# Patient Record
Sex: FEMALE | Race: WHITE | Employment: OTHER | URBAN - METROPOLITAN AREA
[De-identification: names, ages, dates, MRNs, and addresses within clinical notes are randomized per-mention and may not be internally consistent; named-entity substitution may affect disease eponyms.]

---

## 2017-01-26 ENCOUNTER — APPOINTMENT (OUTPATIENT)
Dept: PHYSICAL THERAPY | Age: 82
End: 2017-01-26

## 2017-11-12 ENCOUNTER — HOSPITAL ENCOUNTER (EMERGENCY)
Age: 82
Discharge: HOME | End: 2017-11-12
Attending: EMERGENCY MEDICINE | Admitting: EMERGENCY MEDICINE
Payer: MEDICARE

## 2017-11-12 VITALS
HEIGHT: 66 IN | RESPIRATION RATE: 15 BRPM | WEIGHT: 110 LBS | DIASTOLIC BLOOD PRESSURE: 49 MMHG | TEMPERATURE: 98 F | OXYGEN SATURATION: 99 % | HEART RATE: 71 BPM | BODY MASS INDEX: 17.68 KG/M2 | SYSTOLIC BLOOD PRESSURE: 130 MMHG

## 2017-11-12 DIAGNOSIS — R42 LIGHTHEADED: Primary | ICD-10-CM

## 2017-11-12 LAB
ANION GAP BLD CALC-SCNC: 16 MMOL/L (ref 10–20)
ATRIAL RATE: 73 BPM
BUN BLD-MCNC: 19 MG/DL (ref 7–18)
CA-I BLD-MCNC: 1.31 MMOL/L (ref 1.12–1.32)
CALCULATED P AXIS, ECG09: -21 DEGREES
CALCULATED R AXIS, ECG10: -27 DEGREES
CALCULATED T AXIS, ECG11: 35 DEGREES
CHLORIDE BLD-SCNC: 104 MMOL/L (ref 100–108)
CO2 BLD-SCNC: 26 MMOL/L (ref 19–24)
CREAT UR-MCNC: 1.2 MG/DL (ref 0.6–1.3)
DIAGNOSIS, 93000: NORMAL
GLUCOSE BLD STRIP.AUTO-MCNC: 99 MG/DL (ref 74–106)
HCT VFR BLD CALC: 38 % (ref 36–49)
HGB BLD-MCNC: 12.9 G/DL (ref 12–16)
P-R INTERVAL, ECG05: 188 MS
POTASSIUM BLD-SCNC: 4 MMOL/L (ref 3.5–5.5)
Q-T INTERVAL, ECG07: 400 MS
QRS DURATION, ECG06: 90 MS
QTC CALCULATION (BEZET), ECG08: 440 MS
SODIUM BLD-SCNC: 141 MMOL/L (ref 136–145)
TROPONIN I BLD-MCNC: <0.04 NG/ML (ref 0–0.08)
VENTRICULAR RATE, ECG03: 73 BPM

## 2017-11-12 PROCEDURE — 93005 ELECTROCARDIOGRAM TRACING: CPT

## 2017-11-12 PROCEDURE — 99284 EMERGENCY DEPT VISIT MOD MDM: CPT

## 2017-11-12 PROCEDURE — 84484 ASSAY OF TROPONIN QUANT: CPT

## 2017-11-12 PROCEDURE — 80047 BASIC METABLC PNL IONIZED CA: CPT

## 2017-11-12 RX ORDER — LANOLIN ALCOHOL/MO/W.PET/CERES
1000 CREAM (GRAM) TOPICAL DAILY
COMMUNITY

## 2017-11-12 RX ORDER — EZETIMIBE 10 MG/1
TABLET ORAL
COMMUNITY
End: 2019-05-07 | Stop reason: SDUPTHER

## 2017-11-12 RX ORDER — CARVEDILOL 12.5 MG/1
TABLET ORAL 2 TIMES DAILY WITH MEALS
COMMUNITY
End: 2019-02-07 | Stop reason: DRUGHIGH

## 2017-11-12 RX ORDER — RALOXIFENE HYDROCHLORIDE 60 MG/1
TABLET, FILM COATED ORAL DAILY
COMMUNITY
End: 2019-05-07 | Stop reason: SDUPTHER

## 2017-11-12 RX ORDER — LOSARTAN POTASSIUM 25 MG/1
50 TABLET ORAL DAILY
COMMUNITY
End: 2019-02-07

## 2017-11-12 RX ORDER — ASPIRIN 81 MG/1
TABLET ORAL DAILY
COMMUNITY

## 2017-11-12 RX ORDER — CLOPIDOGREL BISULFATE 75 MG/1
TABLET ORAL
COMMUNITY
End: 2019-02-07

## 2017-11-12 NOTE — DISCHARGE INSTRUCTIONS
Dizziness: Care Instructions  Your Care Instructions  Dizziness is the feeling of unsteadiness or fuzziness in your head. It is different than having vertigo, which is a feeling that the room is spinning or that you are moving or falling. It is also different from lightheadedness, which is the feeling that you are about to faint. It can be hard to know what causes dizziness. Some people feel dizzy when they have migraine headaches. Sometimes bouts of flu can make you feel dizzy. Some medical conditions, such as heart problems or high blood pressure, can make you feel dizzy. Many medicines can cause dizziness, including medicines for high blood pressure, pain, or anxiety. If a medicine causes your symptoms, your doctor may recommend that you stop or change the medicine. If it is a problem with your heart, you may need medicine to help your heart work better. If there is no clear reason for your symptoms, your doctor may suggest watching and waiting for a while to see if the dizziness goes away on its own. Follow-up care is a key part of your treatment and safety. Be sure to make and go to all appointments, and call your doctor if you are having problems. It's also a good idea to know your test results and keep a list of the medicines you take. How can you care for yourself at home? · If your doctor recommends or prescribes medicine, take it exactly as directed. Call your doctor if you think you are having a problem with your medicine. · Do not drive while you feel dizzy. · Try to prevent falls. Steps you can take include:  ¨ Using nonskid mats, adding grab bars near the tub, and using night-lights. ¨ Clearing your home so that walkways are free of anything you might trip on. ¨ Letting family and friends know that you have been feeling dizzy. This will help them know how to help you. When should you call for help? Call 911 anytime you think you may need emergency care.  For example, call if:  ? · You passed out (lost consciousness). ? · You have dizziness along with symptoms of a heart attack. These may include:  ¨ Chest pain or pressure, or a strange feeling in the chest.  ¨ Sweating. ¨ Shortness of breath. ¨ Nausea or vomiting. ¨ Pain, pressure, or a strange feeling in the back, neck, jaw, or upper belly or in one or both shoulders or arms. ¨ Lightheadedness or sudden weakness. ¨ A fast or irregular heartbeat. ? · You have symptoms of a stroke. These may include:  ¨ Sudden numbness, tingling, weakness, or loss of movement in your face, arm, or leg, especially on only one side of your body. ¨ Sudden vision changes. ¨ Sudden trouble speaking. ¨ Sudden confusion or trouble understanding simple statements. ¨ Sudden problems with walking or balance. ¨ A sudden, severe headache that is different from past headaches. ?Call your doctor now or seek immediate medical care if:  ? · You feel dizzy and have a fever, headache, or ringing in your ears. ? · You have new or increased nausea and vomiting. ? · Your dizziness does not go away or comes back. ? Watch closely for changes in your health, and be sure to contact your doctor if:  ? · You do not get better as expected. Where can you learn more? Go to http://lauryn-marely.info/. Enter Q381 in the search box to learn more about \"Dizziness: Care Instructions. \"  Current as of: March 20, 2017  Content Version: 11.4  © 7296-8348 [employer]. Care instructions adapted under license by [employer] (which disclaims liability or warranty for this information). If you have questions about a medical condition or this instruction, always ask your healthcare professional. [place] [address] any warranty or liability for your use of this information.

## 2017-11-12 NOTE — ED NOTES
I have reviewed discharge instructions with the patient and caregiver. The patient and caregiver verbalized understanding.   Patient armband removed and shredded, no scripts given

## 2017-11-12 NOTE — ED PROVIDER NOTES
HPI Comments: 10:03 AM Elisa Hi is a 80 y.o. female w/ PMHx of dementia, HTN, and HLD and surgical hx of TAVR ([facility] 2016) who presents to the ED for evaluation of lightheaded episode PTA. Pt's  is currently admitted to the hospital therefore the pt has been under a lot of stress. Per pt's daughter, the pt took her morning medications on time and ate half a banana with those medications. Pt stated she just became lightheaded briefly, however the pt's daughter said it last 10-15 minutes and claims the pt stated she was \"seeing spots\". Per pt's daughter, the pt grbbed on to her arm during this event \"so she did not fall down\". Since this episode the pt ate half of a breakfast biscuit and claims the lightheadedness and spots have gone away. Pt and pt's daughter deny syncope, LOC, and change in speech; pt denies CP and SOB. Pt admits to drinking 1-2  glasses of wine with dinner every night but denies smoking cigarettes and using illegal drugs. Pt is in no pain or discomfort in the ED. The pt's only complaint in the ED is chronic nasal congestion due to allergies. Past Medical History:   Diagnosis Date    Dementia     Hypercholesteremia     Hypertension        Past Surgical History:   Procedure Laterality Date    CARDIAC SURG PROCEDURE UNLIST      valve replaced         History reviewed. No pertinent family history. Social History     Social History    Marital status:      Spouse name: N/A    Number of children: N/A    Years of education: N/A     Occupational History    Not on file. Social History Main Topics    Smoking status: Never Smoker    Smokeless tobacco: Never Used    Alcohol use Not on file    Drug use: Not on file    Sexual activity: Not on file     Other Topics Concern    Not on file     Social History Narrative         ALLERGIES: Review of patient's allergies indicates no known allergies. Review of Systems   Constitutional: Negative.   Negative for appetite change. HENT: Positive for congestion. Respiratory: Negative for shortness of breath. Cardiovascular: Negative for chest pain. Neurological: Positive for light-headedness. Negative for syncope and speech difficulty. Vitals:    11/12/17 0930 11/12/17 1000   BP: 112/57 130/49   Pulse: 73 71   Resp: 16 15   Temp: 98 °F (36.7 °C)    SpO2: 100% 99%   Weight: 49.9 kg (110 lb)    Height: 5' 6\" (1.676 m)             Physical Exam   Constitutional: She is oriented to person, place, and time. She appears well-developed and well-nourished. No distress. HENT:   Head: Normocephalic and atraumatic. Mouth/Throat: Oropharynx is clear and moist.   Eyes: Conjunctivae and EOM are normal. Pupils are equal, round, and reactive to light. No scleral icterus. Neck: Normal range of motion. Neck supple. Cardiovascular: Normal rate, regular rhythm and normal heart sounds. No murmur heard. Pulmonary/Chest: Effort normal and breath sounds normal. No respiratory distress. Abdominal: Soft. Bowel sounds are normal. She exhibits no distension. There is no tenderness. Musculoskeletal: She exhibits no edema. Lymphadenopathy:     She has no cervical adenopathy. Neurological: She is alert and oriented to person, place, and time. Coordination normal.   Skin: Skin is warm and dry. No rash noted. Psychiatric: She has a normal mood and affect. Her behavior is normal.   Nursing note and vitals reviewed.        MDM  Number of Diagnoses or Management Options  Lightheaded:   Diagnosis management comments: Visiting  in hospital did not eat took Bp meds became lightheaded denies vertigo no syncope or chest pain    Ekg; nsr rate 73 no acute st changes    Took PO in ED bp improved POC chem 8 and trop neg will dc home       Amount and/or Complexity of Data Reviewed  Clinical lab tests: ordered and reviewed      ED Course       Procedures  Vitals:  Patient Vitals for the past 12 hrs:   Temp Pulse Resp BP SpO2 11/12/17 1000 - 71 15 130/49 99 %   11/12/17 0930 98 °F (36.7 °C) 73 16 112/57 100 %       Medications Ordered:  Medications - No data to display    Lab Findings:  Recent Results (from the past 12 hour(s))   EKG, 12 LEAD, INITIAL    Collection Time: 11/12/17  9:54 AM   Result Value Ref Range    Ventricular Rate 73 BPM    Atrial Rate 73 BPM    P-R Interval 188 ms    QRS Duration 90 ms    Q-T Interval 400 ms    QTC Calculation (Bezet) 440 ms    Calculated P Axis -21 degrees    Calculated R Axis -27 degrees    Calculated T Axis 35 degrees    Diagnosis       Normal sinus rhythm  Left ventricular hypertrophy with repolarization abnormality  Abnormal ECG  No previous ECGs available     POC TROPONIN-I    Collection Time: 11/12/17  9:58 AM   Result Value Ref Range    Troponin-I (POC) <0.04 0.00 - 0.08 ng/mL   POC CHEM8    Collection Time: 11/12/17 10:01 AM   Result Value Ref Range    CO2, POC 26 (H) 19 - 24 MMOL/L    Glucose, POC 99 74 - 106 MG/DL    BUN, POC 19 (H) 7 - 18 MG/DL    Creatinine, POC 1.2 0.6 - 1.3 MG/DL    GFRAA, POC 52 (L) >60 ml/min/1.73m2    GFRNA, POC 43 (L) >60 ml/min/1.73m2    Sodium,  136 - 145 MMOL/L    Potassium, POC 4.0 3.5 - 5.5 MMOL/L    Calcium, ionized (POC) 1.31 1.12 - 1.32 MMOL/L    Chloride,  100 - 108 MMOL/L    Anion gap, POC 16 10 - 20      Hematocrit, POC 38 36 - 49 %    Hemoglobin, POC 12.9 12 - 16 G/DL         X-ray, CT or radiology findings or impressions:  No orders to display       Reevaluation of the patient:     Diagnosis:   1.  Lightheaded        Disposition: home     Follow-up Information     Follow up With Details Comments Contact [facility] EMERGENCY DEPT  As needed, If symptoms worsen [address] [place] [address]    Giorgio Willson MD Schedule an appointment as soon as possible for a visit for ED follow up appointment  [address]      Jaya Taylor MD Schedule an appointment as soon as possible for a visit for ED follow up appointment  [address]  [place] [address]  [phone]             Patient's Medications   Start Taking    No medications on file   Continue Taking    ASPIRIN DELAYED-RELEASE 81 MG TABLET    Take  by mouth daily. CARVEDILOL (COREG) 12.5 MG TABLET    Take  by mouth two (2) times daily (with meals). CLOPIDOGREL (PLAVIX) 75 MG TAB    Take  by mouth. CYANOCOBALAMIN (VITAMIN B-12) 1,000 MCG TABLET    Take 1,000 mcg by mouth daily. EZETIMIBE (ZETIA) 10 MG TABLET    Take  by mouth. LOSARTAN (COZAAR) 25 MG TABLET    Take 50 mg by mouth daily. RALOXIFENE (EVISTA) 60 MG TABLET    Take  by mouth daily. These Medications have changed    No medications on file   Stop Taking    No medications on file       [facility] acting as a scribe for and in the presence of Katarina Tomlinson MD      November 12, 2017 at 10:03 AM       Provider Attestation:      I personally performed the services described in the documentation, reviewed the documentation, as recorded by the scribe in my presence, and it accurately and completely records my words and actions.  November 12, 2017 at 10:03 AM - Katarina Tomlinson MD

## 2018-02-26 ENCOUNTER — APPOINTMENT (OUTPATIENT)
Dept: MRI IMAGING | Age: 83
End: 2018-02-26
Attending: EMERGENCY MEDICINE
Payer: MEDICARE

## 2018-02-26 ENCOUNTER — HOSPITAL ENCOUNTER (EMERGENCY)
Age: 83
Discharge: HOME | End: 2018-02-26
Attending: EMERGENCY MEDICINE
Payer: MEDICARE

## 2018-02-26 ENCOUNTER — APPOINTMENT (OUTPATIENT)
Dept: CT IMAGING | Age: 83
End: 2018-02-26
Attending: EMERGENCY MEDICINE
Payer: MEDICARE

## 2018-02-26 ENCOUNTER — APPOINTMENT (OUTPATIENT)
Dept: GENERAL RADIOLOGY | Age: 83
End: 2018-02-26
Attending: EMERGENCY MEDICINE
Payer: MEDICARE

## 2018-02-26 VITALS
OXYGEN SATURATION: 98 % | DIASTOLIC BLOOD PRESSURE: 77 MMHG | RESPIRATION RATE: 16 BRPM | SYSTOLIC BLOOD PRESSURE: 157 MMHG | TEMPERATURE: 97.1 F | HEART RATE: 73 BPM

## 2018-02-26 DIAGNOSIS — R42 DIZZINESS: Primary | ICD-10-CM

## 2018-02-26 DIAGNOSIS — R35.0 URINARY FREQUENCY: ICD-10-CM

## 2018-02-26 DIAGNOSIS — R42 VERTIGO: ICD-10-CM

## 2018-02-26 LAB
ALBUMIN SERPL-MCNC: 4 G/DL (ref 3.4–5)
ALBUMIN/GLOB SERPL: 1 {RATIO} (ref 0.8–1.7)
ALP SERPL-CCNC: 41 U/L (ref 45–117)
ALT SERPL-CCNC: 31 U/L (ref 13–56)
ANION GAP SERPL CALC-SCNC: 6 MMOL/L (ref 3–18)
APPEARANCE UR: CLEAR
APTT PPP: 25.3 SEC (ref 23–36.4)
AST SERPL-CCNC: 23 U/L (ref 15–37)
ATRIAL RATE: 76 BPM
BILIRUB SERPL-MCNC: 0.2 MG/DL (ref 0.2–1)
BILIRUB UR QL: NEGATIVE
BNP SERPL-MCNC: 568 PG/ML (ref 0–1800)
BUN SERPL-MCNC: 29 MG/DL (ref 7–18)
BUN/CREAT SERPL: 26 (ref 12–20)
CALCIUM SERPL-MCNC: 9.2 MG/DL (ref 8.5–10.1)
CALCULATED P AXIS, ECG09: 67 DEGREES
CALCULATED R AXIS, ECG10: -24 DEGREES
CALCULATED T AXIS, ECG11: 61 DEGREES
CHLORIDE SERPL-SCNC: 105 MMOL/L (ref 100–108)
CK MB CFR SERPL CALC: NORMAL % (ref 0–4)
CK MB SERPL-MCNC: <1 NG/ML (ref 5–25)
CK SERPL-CCNC: 45 U/L (ref 26–192)
CO2 SERPL-SCNC: 28 MMOL/L (ref 21–32)
COLOR UR: YELLOW
CREAT SERPL-MCNC: 1.1 MG/DL (ref 0.6–1.3)
DIAGNOSIS, 93000: NORMAL
ERYTHROCYTE [DISTWIDTH] IN BLOOD BY AUTOMATED COUNT: 13.9 % (ref 11.6–14.5)
GLOBULIN SER CALC-MCNC: 4.1 G/DL (ref 2–4)
GLUCOSE SERPL-MCNC: 98 MG/DL (ref 74–99)
GLUCOSE UR STRIP.AUTO-MCNC: NEGATIVE MG/DL
HCT VFR BLD AUTO: 36.3 % (ref 35–45)
HGB BLD-MCNC: 12.1 G/DL (ref 12–16)
HGB UR QL STRIP: NEGATIVE
INR PPP: 1 (ref 0.8–1.2)
KETONES UR QL STRIP.AUTO: NEGATIVE MG/DL
LEUKOCYTE ESTERASE UR QL STRIP.AUTO: NEGATIVE
MCH RBC QN AUTO: 29.2 PG (ref 24–34)
MCHC RBC AUTO-ENTMCNC: 33.3 G/DL (ref 31–37)
MCV RBC AUTO: 87.7 FL (ref 74–97)
NITRITE UR QL STRIP.AUTO: NEGATIVE
P-R INTERVAL, ECG05: 176 MS
PH UR STRIP: 6 [PH] (ref 5–8)
PLATELET # BLD AUTO: 237 K/UL (ref 135–420)
PMV BLD AUTO: 10 FL (ref 9.2–11.8)
POTASSIUM SERPL-SCNC: 4.2 MMOL/L (ref 3.5–5.5)
PROT SERPL-MCNC: 8.1 G/DL (ref 6.4–8.2)
PROT UR STRIP-MCNC: NEGATIVE MG/DL
PROTHROMBIN TIME: 12.3 SEC (ref 11.5–15.2)
Q-T INTERVAL, ECG07: 380 MS
QRS DURATION, ECG06: 80 MS
QTC CALCULATION (BEZET), ECG08: 427 MS
RBC # BLD AUTO: 4.14 M/UL (ref 4.2–5.3)
SODIUM SERPL-SCNC: 139 MMOL/L (ref 136–145)
SP GR UR REFRACTOMETRY: 1.01 (ref 1–1.03)
TROPONIN I SERPL-MCNC: <0.02 NG/ML (ref 0–0.04)
UROBILINOGEN UR QL STRIP.AUTO: 0.2 EU/DL (ref 0.2–1)
VENTRICULAR RATE, ECG03: 76 BPM
WBC # BLD AUTO: 8.6 K/UL (ref 4.6–13.2)

## 2018-02-26 PROCEDURE — 74011250636 HC RX REV CODE- 250/636: Performed by: EMERGENCY MEDICINE

## 2018-02-26 PROCEDURE — 83880 ASSAY OF NATRIURETIC PEPTIDE: CPT | Performed by: EMERGENCY MEDICINE

## 2018-02-26 PROCEDURE — 80053 COMPREHEN METABOLIC PANEL: CPT | Performed by: EMERGENCY MEDICINE

## 2018-02-26 PROCEDURE — 99285 EMERGENCY DEPT VISIT HI MDM: CPT

## 2018-02-26 PROCEDURE — 96376 TX/PRO/DX INJ SAME DRUG ADON: CPT

## 2018-02-26 PROCEDURE — 70450 CT HEAD/BRAIN W/O DYE: CPT

## 2018-02-26 PROCEDURE — 85027 COMPLETE CBC AUTOMATED: CPT | Performed by: EMERGENCY MEDICINE

## 2018-02-26 PROCEDURE — 85610 PROTHROMBIN TIME: CPT | Performed by: EMERGENCY MEDICINE

## 2018-02-26 PROCEDURE — 70551 MRI BRAIN STEM W/O DYE: CPT

## 2018-02-26 PROCEDURE — 84484 ASSAY OF TROPONIN QUANT: CPT | Performed by: EMERGENCY MEDICINE

## 2018-02-26 PROCEDURE — 85730 THROMBOPLASTIN TIME PARTIAL: CPT | Performed by: EMERGENCY MEDICINE

## 2018-02-26 PROCEDURE — 93005 ELECTROCARDIOGRAM TRACING: CPT

## 2018-02-26 PROCEDURE — 70544 MR ANGIOGRAPHY HEAD W/O DYE: CPT

## 2018-02-26 PROCEDURE — 96374 THER/PROPH/DIAG INJ IV PUSH: CPT

## 2018-02-26 PROCEDURE — 81003 URINALYSIS AUTO W/O SCOPE: CPT | Performed by: EMERGENCY MEDICINE

## 2018-02-26 PROCEDURE — 71045 X-RAY EXAM CHEST 1 VIEW: CPT

## 2018-02-26 RX ORDER — DIAZEPAM 10 MG/2ML
5 INJECTION INTRAMUSCULAR
Status: COMPLETED | OUTPATIENT
Start: 2018-02-26 | End: 2018-02-26

## 2018-02-26 RX ORDER — MECLIZINE HCL 12.5 MG 12.5 MG/1
25 TABLET ORAL
Status: COMPLETED | OUTPATIENT
Start: 2018-02-26 | End: 2018-02-26

## 2018-02-26 RX ORDER — MECLIZINE HCL 12.5 MG 12.5 MG/1
12.5 TABLET ORAL
Qty: 20 TAB | Refills: 0 | Status: SHIPPED | OUTPATIENT
Start: 2018-02-26 | End: 2019-02-07

## 2018-02-26 RX ORDER — DIAZEPAM 2 MG/1
2 TABLET ORAL
Qty: 10 TAB | Refills: 0 | Status: SHIPPED | OUTPATIENT
Start: 2018-02-26 | End: 2019-02-07

## 2018-02-26 RX ADMIN — Medication 5 MG: at 18:58

## 2018-02-26 RX ADMIN — MECLIZINE 25 MG: 12.5 TABLET ORAL at 17:12

## 2018-02-26 RX ADMIN — Medication 5 MG: at 17:58

## 2018-02-26 NOTE — ED NOTES
4:32 PM :Pt care assumed from Dr. Jason Perkins , ED provider. Pt complaint(s), current treatment plan, progression and available diagnostic results have been discussed thoroughly. Rounding occurred: yes  Intended Disposition: TBD   Pending diagnostic reports and/or labs (please list): Teleneurology consult, CT scan and reevaluate. MRI with contrast and MRA without contrast. If negative she can be discharged. Notes  5:43 PM Discussed indications for MRI with patient and her family. Discussed risks of not obtaining MRI including not being able to r/o posterior Stroke. She consented to MRI with anxiolysis. 5mg of IV valium was given prior to MRI. Will S/O to overnight physician pending MRI results and reevalutation. 6:44 PM : Pt care transferred to Dr. Lily Huynh  ,ED provider. History of patient complaint(s), available diagnostic reports and current treatment plan has been discussed thoroughly. Bedside rounding on patient occured : yes . Intended disposition of patient : TBD  Pending diagnostics reports and/or labs (please list): MRI results and reevaluation. Scribe Attestation     [employer] acting as a scribe for and in the presence of Ida Salas DO      February 26, 2018 at 4:32 PM       Provider Attestation:      I personally performed the services described in the documentation, reviewed the documentation, as recorded by the scribe in my presence, and it accurately and completely records my words and actions.  February 26, 2018 at [address] [facility], DO

## 2018-02-26 NOTE — ED NOTES
Spoke with Cathi House in MRI - patient screaming and kicking in MRI tube to get out. Notified provider. Orders given for more sedation.

## 2018-02-26 NOTE — ED PROVIDER NOTES
EMERGENCY DEPARTMENT HISTORY AND PHYSICAL EXAM    2:57 PM      Date: 2/26/2018  Patient Name: Eduard Reid    History of Presenting Illness     Chief Complaint   Patient presents with    Dizziness         History Provided By: Patient and Patient's Daughter    Chief Complaint: Dizziness  Duration: 2 Weeks  Timing:  Intermittent  Location: N.A  Quality: N/A  Severity: Moderate  Modifying Factors: Not positional  Associated Symptoms: ear fullness, and frequency      Additional History (Context): 2:58 PM Eduard Reid is a 80 y.o. female with h/o Dementia, HTN, and Hypercholesteremia who presents to ED complaining of intermittent moderate dizziness lasting for few minutes at a time that is not exacerbated by changing positions associated with ear fullness and frequency onset 2 weeks ago. Patient's daughter states that EMS was called today by her  as patient called them to tell them that the room was spinning and she felt like she was going to faint. Upon EMS arrival her BP was high. Denies HA, N/V/D, CP, ear ringing, and numbness and weakness. States that she has fullness in the ear but has ear drops at home from PCP. Daughter states the dizziness seems to occur when she's home alone. No other concerns or symptoms at this time. PCP: Wes Rabago MD      Current Outpatient Prescriptions   Medication Sig Dispense Refill    carvedilol (COREG) 12.5 mg tablet Take  by mouth two (2) times daily (with meals).  aspirin delayed-release 81 mg tablet Take  by mouth daily.  ezetimibe (ZETIA) 10 mg tablet Take  by mouth.  clopidogrel (PLAVIX) 75 mg tab Take  by mouth.  cyanocobalamin (VITAMIN B-12) 1,000 mcg tablet Take 1,000 mcg by mouth daily.  losartan (COZAAR) 25 mg tablet Take 50 mg by mouth daily.  raloxifene (EVISTA) 60 mg tablet Take  by mouth daily.          Past History     Past Medical History:  Past Medical History:   Diagnosis Date    Dementia     Hypercholesteremia     Hypertension        Past Surgical History:  Past Surgical History:   Procedure Laterality Date    CARDIAC SURG PROCEDURE UNLIST      valve replaced       Family History:  History reviewed. No pertinent family history. Social History:  Social History   Substance Use Topics    Smoking status: Never Smoker    Smokeless tobacco: Never Used    Alcohol use None       Allergies:  No Known Allergies      Review of Systems       Review of Systems   Constitutional: Negative for activity change, fatigue and fever. HENT: Negative for congestion and rhinorrhea. Ear fullness     Eyes: Negative for visual disturbance. Respiratory: Negative for shortness of breath. Cardiovascular: Negative for chest pain and palpitations. Gastrointestinal: Negative for abdominal pain, diarrhea, nausea and vomiting. Genitourinary: Positive for frequency. Negative for dysuria and hematuria. Musculoskeletal: Negative for back pain. Skin: Negative for rash. Neurological: Positive for dizziness. Negative for weakness and light-headedness. Psychiatric/Behavioral: Negative for agitation. All other systems reviewed and are negative. Physical Exam     Visit Vitals    BP (!) 164/104 (BP 1 Location: Right arm, BP Patient Position: At rest)    Pulse 78    Temp 96.9 °F (36.1 °C)    Resp 16    SpO2 100%         Physical Exam   Constitutional: She appears well-developed and well-nourished. No distress. HENT:   Head: Normocephalic and atraumatic. Right Ear: External ear normal.   Left Ear: External ear normal.   Nose: Nose normal.   Mouth/Throat: Oropharynx is clear and moist.   Eyes: Conjunctivae and EOM are normal. Pupils are equal, round, and reactive to light. No scleral icterus. Right eye exhibits no nystagmus. Left eye exhibits no nystagmus. Neck: Normal range of motion. Neck supple. No JVD present. No tracheal deviation present. No thyromegaly present.    Cardiovascular: Normal rate and regular rhythm. Exam reveals no friction rub. No murmur heard. Pulmonary/Chest: Effort normal and breath sounds normal. No stridor. She exhibits no tenderness. Abdominal: Soft. Bowel sounds are normal. She exhibits no distension. There is no tenderness. There is no rebound and no guarding. Musculoskeletal: Normal range of motion. She exhibits no edema or tenderness. Lymphadenopathy:     She has no cervical adenopathy. Neurological: She is alert. No cranial nerve deficit. Coordination normal.   Skin: Skin is warm and dry. Psychiatric: She has a normal mood and affect. Her behavior is normal. Judgment and thought content normal.   Nursing note and vitals reviewed.         Diagnostic Study Results     Labs -  Recent Results (from the past 12 hour(s))   EKG, 12 LEAD, INITIAL    Collection Time: 02/26/18  2:22 PM   Result Value Ref Range    Ventricular Rate 76 BPM    Atrial Rate 76 BPM    P-R Interval 176 ms    QRS Duration 80 ms    Q-T Interval 380 ms    QTC Calculation (Bezet) 427 ms    Calculated P Axis 67 degrees    Calculated R Axis -24 degrees    Calculated T Axis 61 degrees    Diagnosis       Normal sinus rhythm  Minimal voltage criteria for LVH, may be normal variant  Borderline ECG  When compared with ECG of 12-NOV-2017 09:54,  T wave inversion no longer evident in Lateral leads     URINALYSIS W/ RFLX MICROSCOPIC    Collection Time: 02/26/18  2:58 PM   Result Value Ref Range    Color YELLOW      Appearance CLEAR      Specific gravity 1.008 1.005 - 1.030      pH (UA) 6.0 5.0 - 8.0      Protein NEGATIVE  NEG mg/dL    Glucose NEGATIVE  NEG mg/dL    Ketone NEGATIVE  NEG mg/dL    Bilirubin NEGATIVE  NEG      Blood NEGATIVE  NEG      Urobilinogen 0.2 0.2 - 1.0 EU/dL    Nitrites NEGATIVE  NEG      Leukocyte Esterase NEGATIVE  NEG         Radiologic Studies -   XR CHEST SNGL V    (Results Pending)   CT HEAD WO CONT    (Results Pending)         Medical Decision Making   I am the first provider for this patient and provided care for this patient. I reviewed the vital signs, available nursing notes, past medical history, past surgical history, family history and social history. Vital Signs-Reviewed the patient's vital signs. Pulse Oximetry Analysis -  100% on room air , Normal    EKG: Interpreted by the EP. Time Interpreted: 14:22   Rate: 76 bpm   Rhythm: Sinus Rhythm    Interpretation: Laeftward axis. LVH. Nml Intervals. Records Reviewed: Nursing Notes and Old Medical Records (Time of Review: 2:57 PM)    ED Course: Progress Notes, Reevaluation, and Consults:  3:10 PM Consult: I discussed care with Dr. Nuzhat Batres (Teleneurology). It was a standard discussion including patient history, chief complaint, available diagnostic results, and predicted treatment course. Will evalaute. 3:44 PM Consult: I discussed care with Dr. Nuzhat Batres (Teleneurology). It was a standard discussion including patient history, chief complaint, available diagnostic results, and predicted treatment course. Concerned for vertigo being central cause. Recommends MRI with contrast and MRA without contrast. If negative she is safe to go home. Provider Notes (Medical Decision Making):   Patient with vertigo that is intermittent, not present currently, not posituonal lasting few minutes. unclear of radiology, will check labs, UA, CT and consult teleneurolgy. 1630  Discussed in detail with patient and daughter that if CT/MRI negative she can be safely send home and they are comfortable with the plan. Diagnosis     Clinical Impression:   1. Dizziness    2. Vertigo    3. Urinary frequency        Disposition:   3:18 PM : Pt care transferred to Dr. Hartford Mcburney  ,ED provider. History of patient complaint(s), available diagnostic reports and current treatment plan has been discussed thoroughly. Bedside rounding on patient occured : yes .   Intended disposition of patient : discharge patient home if MRI/CT negative  Pending diagnostics reports and/or labs (please list):  Teleneurology consult, CT scan and reevaluate. MRI with contrast and MRA without contrast. If negative she can be discharged. Follow-up Information     None           Patient's Medications   Start Taking    No medications on file   Continue Taking    ASPIRIN DELAYED-RELEASE 81 MG TABLET    Take  by mouth daily. CARVEDILOL (COREG) 12.5 MG TABLET    Take  by mouth two (2) times daily (with meals). CLOPIDOGREL (PLAVIX) 75 MG TAB    Take  by mouth. CYANOCOBALAMIN (VITAMIN B-12) 1,000 MCG TABLET    Take 1,000 mcg by mouth daily. EZETIMIBE (ZETIA) 10 MG TABLET    Take  by mouth. LOSARTAN (COZAAR) 25 MG TABLET    Take 50 mg by mouth daily. RALOXIFENE (EVISTA) 60 MG TABLET    Take  by mouth daily. These Medications have changed    No medications on file   Stop Taking    No medications on file     _______________________________    Attestations:  Alexandra Law acting as a scribe for and in the presence of Margarito Prieto MD      February 26, 2018 at 2:57 PM       Provider Attestation:      I personally performed the services described in the documentation, reviewed the documentation, as recorded by the scribe in my presence, and it accurately and completely records my words and actions.  February 26, 2018 at 2:57 PM - Margarito Prieto MD    _____________________________

## 2018-02-27 NOTE — ED NOTES
Vitals:  Patient Vitals for the past 12 hrs:   Temp Pulse Resp BP SpO2   02/26/18 1716 - 73 16 194/82 99 %   02/26/18 1404 96.9 °F (36.1 °C) 78 16 (!) 164/104 100 %         Medications ordered:   Medications   meclizine (ANTIVERT) tablet 25 mg (25 mg Oral Given 2/26/18 1712)   diazePAM (VALIUM) injection 5 mg (5 mg IntraVENous Given 2/26/18 1758)   diazePAM (VALIUM) injection 5 mg (5 mg IntraVENous Given 2/26/18 1858)         Lab findings:  Recent Results (from the past 12 hour(s))   EKG, 12 LEAD, INITIAL    Collection Time: 02/26/18  2:22 PM   Result Value Ref Range    Ventricular Rate 76 BPM    Atrial Rate 76 BPM    P-R Interval 176 ms    QRS Duration 80 ms    Q-T Interval 380 ms    QTC Calculation (Bezet) 427 ms    Calculated P Axis 67 degrees    Calculated R Axis -24 degrees    Calculated T Axis 61 degrees    Diagnosis       Normal sinus rhythm  Borderline ECG  When compared with ECG of 12-NOV-2017 09:54,  T wave inversion no longer evident in Lateral leads  Confirmed by Indira Lange [id number]) on 2/26/2018 8:03:09 PM     URINALYSIS W/ RFLX MICROSCOPIC    Collection Time: 02/26/18  2:58 PM   Result Value Ref Range    Color YELLOW      Appearance CLEAR      Specific gravity 1.008 1.005 - 1.030      pH (UA) 6.0 5.0 - 8.0      Protein NEGATIVE  NEG mg/dL    Glucose NEGATIVE  NEG mg/dL    Ketone NEGATIVE  NEG mg/dL    Bilirubin NEGATIVE  NEG      Blood NEGATIVE  NEG      Urobilinogen 0.2 0.2 - 1.0 EU/dL    Nitrites NEGATIVE  NEG      Leukocyte Esterase NEGATIVE  NEG     CARDIAC PANEL,(CK, CKMB & TROPONIN)    Collection Time: 02/26/18  3:10 PM   Result Value Ref Range    CK 45 26 - 192 U/L    CK - MB <1.0 <3.6 ng/ml    CK-MB Index  0.0 - 4.0 %     CALCULATION NOT PERFORMED WHEN RESULT IS BELOW LINEAR LIMIT    Troponin-I, Qt. <0.02 0.0 - 0.045 NG/ML   CBC W/O DIFF    Collection Time: 02/26/18  3:10 PM   Result Value Ref Range    WBC 8.6 4.6 - 13.2 K/uL    RBC 4.14 (L) 4.20 - 5.30 M/uL    HGB 12.1 12.0 - 16.0 g/dL HCT 36.3 35.0 - 45.0 %    MCV 87.7 74.0 - 97.0 FL    MCH 29.2 24.0 - 34.0 PG    MCHC 33.3 31.0 - 37.0 g/dL    RDW 13.9 11.6 - 14.5 %    PLATELET 751 135 - 484 K/uL    MPV 10.0 9.2 - 57.3 FL   METABOLIC PANEL, COMPREHENSIVE    Collection Time: 02/26/18  3:10 PM   Result Value Ref Range    Sodium 139 136 - 145 mmol/L    Potassium 4.2 3.5 - 5.5 mmol/L    Chloride 105 100 - 108 mmol/L    CO2 28 21 - 32 mmol/L    Anion gap 6 3.0 - 18 mmol/L    Glucose 98 74 - 99 mg/dL    BUN 29 (H) 7.0 - 18 MG/DL    Creatinine 1.10 0.6 - 1.3 MG/DL    BUN/Creatinine ratio 26 (H) 12 - 20      GFR est AA 57 (L) >60 ml/min/1.73m2    GFR est non-AA 47 (L) >60 ml/min/1.73m2    Calcium 9.2 8.5 - 10.1 MG/DL    Bilirubin, total 0.2 0.2 - 1.0 MG/DL    ALT (SGPT) 31 13 - 56 U/L    AST (SGOT) 23 15 - 37 U/L    Alk. phosphatase 41 (L) 45 - 117 U/L    Protein, total 8.1 6.4 - 8.2 g/dL    Albumin 4.0 3.4 - 5.0 g/dL    Globulin 4.1 (H) 2.0 - 4.0 g/dL    A-G Ratio 1.0 0.8 - 1.7     NT-PRO BNP    Collection Time: 02/26/18  3:10 PM   Result Value Ref Range    NT pro- 0 - 1800 PG/ML   PROTHROMBIN TIME + INR    Collection Time: 02/26/18  3:10 PM   Result Value Ref Range    Prothrombin time 12.3 11.5 - 15.2 sec    INR 1.0 0.8 - 1.2     PTT    Collection Time: 02/26/18  3:10 PM   Result Value Ref Range    aPTT 25.3 23.0 - 36.4 SEC       EKG interpretation by ED Physician:      X-Ray, CT or other radiology findings or impressions:  MRI BRAIN WO CONT   Final Result      CT HEAD WO CONT   Final Result      XR CHEST SNGL V   Final Result      MRA BRAIN WO CONT    (Results Pending)       Progress notes, Consult notes or additional Procedure notes:   Reviewed results of mri/mra. No acute cva, sig acute findings.  Will send home on valium, meclizine  T/o from dr Nancy Gill who had received t/o from dr haskins  I have reviewed both notes   I have discussed with patient and/or family/sig other the results, interpretation of any imaging if performed, suspected diagnosis and treatment plan to include instructions regarding the diagnoses listed to which understanding was expressed with all questions answered      Reevaluation of patient:   stable    Disposition:  Diagnosis:   1. Dizziness    2. Vertigo    3. Urinary frequency        Disposition: home    Follow-up Information     Follow up With Details Comments Contact Info    Anika Goldstein MD Schedule an appointment as soon as possible for a visit this week for recheck in office [id number] [id number] [phone]              Patient's Medications   Start Taking    DIAZEPAM (VALIUM) 2 MG TABLET    Take 1 Tab by mouth every twelve (12) hours as needed (dizziness). Max Daily Amount: 4 mg. MECLIZINE (ANTIVERT) 12.5 MG TABLET    Take 1 Tab by mouth three (3) times daily as needed. Continue Taking    ASPIRIN DELAYED-RELEASE 81 MG TABLET    Take  by mouth daily. CARVEDILOL (COREG) 12.5 MG TABLET    Take  by mouth two (2) times daily (with meals). CLOPIDOGREL (PLAVIX) 75 MG TAB    Take  by mouth. CYANOCOBALAMIN (VITAMIN B-12) 1,000 MCG TABLET    Take 1,000 mcg by mouth daily. EZETIMIBE (ZETIA) 10 MG TABLET    Take  by mouth. LOSARTAN (COZAAR) 25 MG TABLET    Take 50 mg by mouth daily. RALOXIFENE (EVISTA) 60 MG TABLET    Take  by mouth daily.    These Medications have changed    No medications on file   Stop Taking    No medications on file

## 2018-06-11 ENCOUNTER — HOSPITAL ENCOUNTER (OUTPATIENT)
Dept: GENERAL RADIOLOGY | Age: 83
Discharge: HOME | End: 2018-06-11
Attending: INTERNAL MEDICINE
Payer: MEDICARE

## 2018-06-11 DIAGNOSIS — M85.80 OTHER SPECIFIED DISORDERS OF BONE DENSITY AND STRUCTURE: ICD-10-CM

## 2018-06-11 PROCEDURE — 77080 DXA BONE DENSITY AXIAL: CPT

## 2019-02-07 ENCOUNTER — OFFICE VISIT (OUTPATIENT)
Dept: INTERNAL MEDICINE CLINIC | Age: 84
End: 2019-02-07

## 2019-02-07 VITALS
RESPIRATION RATE: 18 BRPM | TEMPERATURE: 96.9 F | DIASTOLIC BLOOD PRESSURE: 51 MMHG | WEIGHT: 138.2 LBS | HEIGHT: 66 IN | BODY MASS INDEX: 22.21 KG/M2 | SYSTOLIC BLOOD PRESSURE: 122 MMHG | HEART RATE: 73 BPM | OXYGEN SATURATION: 99 %

## 2019-02-07 DIAGNOSIS — M79.671 RIGHT FOOT PAIN: ICD-10-CM

## 2019-02-07 DIAGNOSIS — M85.89 OSTEOPENIA OF MULTIPLE SITES: ICD-10-CM

## 2019-02-07 DIAGNOSIS — Z95.2 S/P TAVR (TRANSCATHETER AORTIC VALVE REPLACEMENT): ICD-10-CM

## 2019-02-07 DIAGNOSIS — H61.23 CERUMEN DEBRIS ON TYMPANIC MEMBRANE OF BOTH EARS: ICD-10-CM

## 2019-02-07 DIAGNOSIS — F32.89 OTHER DEPRESSION: ICD-10-CM

## 2019-02-07 DIAGNOSIS — I10 ESSENTIAL HYPERTENSION: Primary | ICD-10-CM

## 2019-02-07 DIAGNOSIS — R41.3 MEMORY DISTURBANCE: ICD-10-CM

## 2019-02-07 PROBLEM — F32.A DEPRESSION: Status: ACTIVE | Noted: 2019-02-07

## 2019-02-07 RX ORDER — ACETAMINOPHEN 500 MG
500 TABLET ORAL 2 TIMES DAILY
Qty: 100 TAB | Refills: 3
Start: 2019-02-07 | End: 2019-04-15 | Stop reason: SDUPTHER

## 2019-02-07 RX ORDER — CARVEDILOL 3.12 MG/1
3.12 TABLET ORAL 2 TIMES DAILY WITH MEALS
Qty: 180 TAB | Refills: 3
Start: 2019-02-07 | End: 2019-04-15 | Stop reason: SDUPTHER

## 2019-02-07 RX ORDER — MIRTAZAPINE 15 MG/1
TABLET, FILM COATED ORAL
COMMUNITY
End: 2019-02-07 | Stop reason: SDUPTHER

## 2019-02-07 RX ORDER — LOSARTAN POTASSIUM 25 MG/1
25 TABLET ORAL DAILY
Qty: 90 TAB | Refills: 3
Start: 2019-02-07 | End: 2019-04-15 | Stop reason: SDUPTHER

## 2019-02-07 RX ORDER — MIRTAZAPINE 15 MG/1
15 TABLET, FILM COATED ORAL
Qty: 90 TAB | Refills: 3 | Status: SHIPPED | OUTPATIENT
Start: 2019-02-07 | End: 2019-04-15 | Stop reason: SDUPTHER

## 2019-02-07 RX ORDER — ERGOCALCIFEROL 1.25 MG/1
CAPSULE ORAL
COMMUNITY
Start: 2019-01-12 | End: 2019-02-07

## 2019-02-07 RX ORDER — MELATONIN 5 MG
5 CAPSULE ORAL
COMMUNITY

## 2019-02-07 NOTE — PATIENT INSTRUCTIONS
Learning About Low Bone Density What is low bone density? Low bone density (sometimes called osteopenia) is a decrease in thickness, or density, in bones. That means the bones become thinner and weaker. It is much more common in women than in men. It is an early form of osteoporosis, a condition in which the bones are so thin and weak that they can break easily. It's important to know that low bone density is not a disease. It can happen normally with aging. Having low bone density means that there is a greater risk that you may get osteoporosis. It also means that you are more likely to break a bone than someone who does not have low bone density. But not everyone with low bone density gets osteoporosis or breaks a bone. Low bone density doesn't cause any symptoms. It's usually found with a type of X-ray called a bone density test. Low bone density means that your bone density result (T-score) is between 1.0 and 2.5. What increases your risk for low bone density? Things that increase your risk include: 
· Having a family history of osteoporosis. · Being thin. · Being white or . · Getting too little physical activity. · Smoking. · Drinking too much alcohol often. · Using certain medicines such as steroids. How can you prevent osteoporosis? There are things you can do to help prevent osteoporosis. Certain lifestyle changes will help slow the loss of bone density. · Eat food that has plenty of calcium and vitamin D. Yogurt, cheese, milk, and dark green vegetables are high in calcium. Eggs, fatty fish, cereal, and fortified milk are high in vitamin D. 
· Talk to your doctor about taking a calcium supplement that has vitamin D in it. · Get regular exercise. ? Do 30 minutes of weight-bearing exercise on most days of the week. Walking, jogging, stair climbing, and dancing are good choices. ? Do resistance exercises with weights or elastic bands 2 or 3 days a week. · Limit alcohol to 2 drinks a day for men and 1 drink a day for women. Too much alcohol can cause health problems. · Do not smoke. Smoking can make bones thin faster. If you need help quitting, talk to your doctor about stop-smoking programs and medicines. These can increase your chances of quitting for good. Prescription medicines are available for treating low bone density. But these are more often used to treat osteoporosis. Follow-up care is a key part of your treatment and safety. Be sure to make and go to all appointments, and call your doctor if you are having problems. It's also a good idea to know your test results and keep a list of the medicines you take. Where can you learn more? Go to http://lauryn-marely.info/. Enter B262 in the search box to learn more about \"Learning About Low Bone Density. \" Current as of: March 15, 2018 Content Version: 11.9 © 7808-1269 [employer]. Care instructions adapted under license by [employer] (which disclaims liability or warranty for this information). If you have questions about a medical condition or this instruction, always ask your healthcare professional. [place] [address] any warranty or liability for your use of this information. Earwax Blockage: Care Instructions Your Care Instructions Earwax is a natural substance that protects the ear canal. Normally, earwax drains from the ears and does not cause problems. Sometimes earwax builds up and hardens. Earwax blockage (also called cerumen impaction) can cause some loss of hearing and pain. When wax is tightly packed, you will need to have your doctor remove it. Follow-up care is a key part of your treatment and safety. Be sure to make and go to all appointments, and call your doctor if you are having problems. It's also a good idea to know your test results and keep a list of the medicines you take. How can you care for yourself at home? · Do not try to remove earwax with cotton swabs, fingers, or other objects. This can make the blockage worse and damage the eardrum. · If your doctor recommends that you try to remove earwax at home: ? Soften and loosen the earwax with warm mineral oil. You also can try hydrogen peroxide mixed with an equal amount of room temperature water. Place 2 drops of the fluid, warmed to body temperature, in the ear two times a day for up to 5 days. ? Once the wax is loose and soft, all that is usually needed to remove it from the ear canal is a gentle, warm shower. Direct the water into the ear, then tip your head to let the earwax drain out. Dry your ear thoroughly with a hair dryer set on low. Hold the dryer several inches from your ear. ? If the warm mineral oil and shower do not work, use an over-the-counter wax softener. Read and follow all instructions on the label. After using the wax softener, use an ear syringe to gently flush the ear. Make sure the flushing solution is body temperature. Cool or hot fluids in the ear can cause dizziness. When should you call for help? Call your doctor now or seek immediate medical care if: 
  · Pus or blood drains from your ear.  
  · Your ears are ringing or feel full.  
  · You have a loss of hearing.  
 Watch closely for changes in your health, and be sure to contact your doctor if: 
  · You have pain or reduced hearing after 1 week of home treatment.  
  · You have any new symptoms, such as nausea or balance problems. Where can you learn more? Go to http://lauryn-marely.info/. Enter T125 in the search box to learn more about \"Earwax Blockage: Care Instructions. \" Current as of: September 23, 2018 Content Version: 11.9 © 4172-0228 [employer].  Care instructions adapted under license by [employer] (which disclaims liability or warranty for this information). If you have questions about a medical condition or this instruction, always ask your healthcare professional. [place] [address] any warranty or liability for your use of this information.

## 2019-02-07 NOTE — PROGRESS NOTES
ROOM # 17 Identified pt with two pt identifiers(name and ). Reviewed record in preparation for visit and have obtained necessary documentation. Chief Complaint Patient presents with [facility] Establish Care Shannan Nesbitt preferred language for health care discussion is english/other. Is the patient using any DME equipment during OV? YES 
 
Shannan Nesbitt is due for: 
Health Maintenance Due Topic  Shingrix Vaccine Age 50> (1 of 2)  GLAUCOMA SCREENING Q2Y  Pneumococcal 65+ Low/Medium Risk (2 of 2 - PCV13)  MEDICARE YEARLY EXAM   
 Influenza Age 5 to Adult Health Maintenance reviewed and discussed per provider Please order/place referral if appropriate. Advance Directive: 1. Do you have an advance directive in place? Patient Reply: NO 
 
2. If not, would you like material regarding how to put one in place? NO Coordination of Care: 1. Have you been to the ER, urgent care clinic since your last visit? Hospitalized since your last visit? YES 
 
2. Have you seen or consulted any other health care providers outside of the [address] [place] [address] since your last visit? Include any pap smears or colon screening. NO Patient is accompanied by daughter I have received verbal consent from Shannan Nesbitt to discuss any/all medical information while they are present in the room. Learning Assessment: 
No flowsheet data found. Depression Screening: PHQ over the last two weeks 2019 Little interest or pleasure in doing things Not at all Feeling down, depressed, irritable, or hopeless Not at all Total Score PHQ 2 0 Abuse Screening: No flowsheet data found. Fall Risk Fall Risk Assessment, last 12 mths 2019 Able to walk? Yes Fall in past 12 months?  No

## 2019-02-07 NOTE — PROGRESS NOTES
HISTORY OF PRESENT ILLNESS 
Candie Abad is a 80 y.o. female. 79 yo female here to establish care, f/u of depression, HTN, foot pain. She lives alone in single floor condo. Accompanied by her daughter Paulo Pittman today. Has caregivers (med management, meal assistance) for a few hours M-F, daughters assist on weekend. She is  since 2018 after 76 years of marriage. She has had depressive sx since his death. Had been tried on multiple agents which caused side effects (diarrhea) but Remeron seems to be helping and allows for better sleep. Some cognitive changes. She is not sig bothered by these and has good support. Had discussed with prior PCP Dr. Britney Nunez referral to [facility], but she is not interested in this. H/o HTN but this is currently well controlled. Has had medication dose reduced due to low readings. H/o TAVR in 2016 and has been doing well since that time. She has chronic R foot pain since fracture a few days after her 's death. Taking prn tylenol . Increased pain today due to her footwear; better if she wears her tennis sneakers at home. H/o osteopenia  - on Evista. Last DEXA with some decline in 2018. Review of Systems Constitutional: Negative for chills, fever and weight loss. HENT: Positive for hearing loss. Negative for congestion and ear pain. Eyes: Negative for blurred vision and pain. Respiratory: Negative for cough and shortness of breath. Cardiovascular: Negative for chest pain, palpitations and leg swelling. Gastrointestinal: Negative for nausea and vomiting. Genitourinary: Negative for frequency and urgency. Musculoskeletal: Positive for joint pain. Negative for myalgias. Skin: Negative for itching and rash. Neurological: Negative for dizziness, tingling and headaches. Psychiatric/Behavioral: Positive for depression. The patient is not nervous/anxious. Past Medical History:  
Diagnosis Date  Dementia  Hypercholesteremia  Hypertension Past Surgical History:  
Procedure Laterality Date  CARDIAC SURG PROCEDURE UNLIST    
 valve replaced Current Outpatient Medications on File Prior to Visit Medication Sig Dispense Refill  melatonin 5 mg cap capsule Take 5 mg by mouth nightly.  mirtazapine (REMERON) 15 mg tablet Take  by mouth nightly.  carvedilol (COREG) 12.5 mg tablet Take  by mouth two (2) times daily (with meals).  aspirin delayed-release 81 mg tablet Take  by mouth daily.  ezetimibe (ZETIA) 10 mg tablet Take  by mouth.  cyanocobalamin (VITAMIN B-12) 1,000 mcg tablet Take 1,000 mcg by mouth daily.  losartan (COZAAR) 25 mg tablet Take 50 mg by mouth daily.  raloxifene (EVISTA) 60 mg tablet Take  by mouth daily.  VITAMIN D2 50,000 unit capsule  diazePAM (VALIUM) 2 mg tablet Take 1 Tab by mouth every twelve (12) hours as needed (dizziness). Max Daily Amount: 4 mg. 10 Tab 0  
 meclizine (ANTIVERT) 12.5 mg tablet Take 1 Tab by mouth three (3) times daily as needed. 20 Tab 0  clopidogrel (PLAVIX) 75 mg tab Take  by mouth. No current facility-administered medications on file prior to visit. No Known Allergies Social History Tobacco Use  Smoking status: Never Smoker  Smokeless tobacco: Never Used Substance Use Topics  Alcohol use: Yes Comment: socially  Drug use: No  
 
Physical Exam  
Constitutional: She appears well-developed and well-nourished. No distress. /51 (BP 1 Location: Left arm, BP Patient Position: Sitting)   Pulse 73   Temp 96.9 °F (36.1 °C) (Oral)   Resp 18   Ht 5' 6\" (1.676 m)   Wt 138 lb 3.2 oz (62.7 kg)   SpO2 99%   BMI 22.31 kg/m² HENT:  
+ cerumen debris B Eyes: EOM are normal. Right eye exhibits no discharge. Left eye exhibits no discharge. No scleral icterus. Neck: Neck supple. Cardiovascular: Normal rate, regular rhythm and normal heart sounds. Exam reveals no gallop and no friction rub. No murmur heard. Pulmonary/Chest: Effort normal and breath sounds normal. No respiratory distress. She has no wheezes. She has no rales. Abdominal: Soft. She exhibits no distension. There is no tenderness. Musculoskeletal: She exhibits no edema or tenderness. Lymphadenopathy:  
  She has no cervical adenopathy. Neurological: She is alert. She exhibits normal muscle tone. Skin: Skin is warm and dry. Psychiatric: She has a normal mood and affect. Lab Results Component Value Date/Time Sodium 139 02/26/2018 03:10 PM  
 Potassium 4.2 02/26/2018 03:10 PM  
 Chloride 105 02/26/2018 03:10 PM  
 CO2 28 02/26/2018 03:10 PM  
 Anion gap 6 02/26/2018 03:10 PM  
 Glucose 98 02/26/2018 03:10 PM  
 BUN 29 (H) 02/26/2018 03:10 PM  
 Creatinine 1.10 02/26/2018 03:10 PM  
 BUN/Creatinine ratio 26 (H) 02/26/2018 03:10 PM  
 GFR est AA 57 (L) 02/26/2018 03:10 PM  
 GFR est non-AA 47 (L) 02/26/2018 03:10 PM  
 Calcium 9.2 02/26/2018 03:10 PM  
 Bilirubin, total 0.2 02/26/2018 03:10 PM  
 AST (SGOT) 23 02/26/2018 03:10 PM  
 Alk. phosphatase 41 (L) 02/26/2018 03:10 PM  
 Protein, total 8.1 02/26/2018 03:10 PM  
 Albumin 4.0 02/26/2018 03:10 PM  
 Globulin 4.1 (H) 02/26/2018 03:10 PM  
 A-G Ratio 1.0 02/26/2018 03:10 PM  
 ALT (SGPT) 31 02/26/2018 03:10 PM  
 
Lab Results Component Value Date/Time WBC 8.6 02/26/2018 03:10 PM  
 Hemoglobin, POC 12.9 11/12/2017 10:01 AM  
 HGB 12.1 02/26/2018 03:10 PM  
 Hematocrit, POC 38 11/12/2017 10:01 AM  
 HCT 36.3 02/26/2018 03:10 PM  
 PLATELET 633 21/82/3272 03:10 PM  
 MCV 87.7 02/26/2018 03:10 PM  
No results found for: HBA1C, HGBE8, VMM2UADS, SHM3CJFM 
 
ASSESSMENT and PLAN 
  ICD-10-CM ICD-9-CM 1. Essential hypertension I10 401.9 2. S/P TAVR (transcatheter aortic valve replacement) Z95.2 V43.3 3. Osteopenia of multiple sites M85.89 733.90   
4. Other depression F32.89 311   
5. Memory disturbance R41.3 780.93   
6. Right foot pain M79.671 729.5 7. Cerumen debris on tympanic membrane of both ears H61.23 380.4 REMOVAL IMPACTED CERUMEN IRRIGATION/LVG UNILAT Old records requested. Will continue with current medication for now. Can try scheduled tylenol 1000mg BID for her chronic foot pain. Can consider stopping melatonin, B12 in the future. Has good social support. Not interested in further cognitive testing at this time. Will monitor. B cerumen. Irrigated in office today.

## 2019-04-15 RX ORDER — CARVEDILOL 3.12 MG/1
3.12 TABLET ORAL 2 TIMES DAILY WITH MEALS
Qty: 180 TAB | Refills: 3 | Status: SHIPPED | OUTPATIENT
Start: 2019-04-15 | End: 2019-08-07 | Stop reason: SDUPTHER

## 2019-04-15 RX ORDER — MIRTAZAPINE 15 MG/1
15 TABLET, FILM COATED ORAL
Qty: 90 TAB | Refills: 3 | Status: SHIPPED | OUTPATIENT
Start: 2019-04-15 | End: 2019-08-07 | Stop reason: SDUPTHER

## 2019-04-15 RX ORDER — LOSARTAN POTASSIUM 25 MG/1
25 TABLET ORAL DAILY
Qty: 90 TAB | Refills: 3 | Status: SHIPPED | OUTPATIENT
Start: 2019-04-15 | End: 2019-08-07 | Stop reason: SDUPTHER

## 2019-04-15 RX ORDER — ACETAMINOPHEN 500 MG
500 TABLET ORAL 2 TIMES DAILY
Qty: 100 TAB | Refills: 3 | Status: SHIPPED | OUTPATIENT
Start: 2019-04-15

## 2019-04-15 NOTE — TELEPHONE ENCOUNTER
Requested Prescriptions     Pending Prescriptions Disp Refills    carvedilol (COREG) 3.125 mg tablet 180 Tab 3     Sig: Take 1 Tab by mouth two (2) times daily (with meals).  acetaminophen (TYLENOL) 500 mg tablet 100 Tab 3     Sig: Take 1 Tab by mouth two (2) times a day. Indications: Pain    losartan (COZAAR) 25 mg tablet 90 Tab 3     Sig: Take 1 Tab by mouth daily.  mirtazapine (REMERON) 15 mg tablet 90 Tab 3     Sig: Take 1 Tab by mouth nightly.

## 2019-05-07 RX ORDER — EZETIMIBE 10 MG/1
10 TABLET ORAL DAILY
Qty: 90 TAB | Refills: 1 | Status: SHIPPED | OUTPATIENT
Start: 2019-05-07 | End: 2019-08-07 | Stop reason: SDUPTHER

## 2019-05-07 RX ORDER — FLUTICASONE PROPIONATE 50 MCG
2 SPRAY, SUSPENSION (ML) NASAL DAILY
Qty: 3 BOTTLE | Refills: 1 | Status: SHIPPED | OUTPATIENT
Start: 2019-05-07 | End: 2019-08-07 | Stop reason: SDUPTHER

## 2019-05-07 RX ORDER — ERGOCALCIFEROL 1.25 MG/1
50000 CAPSULE ORAL
Qty: 12 CAP | Refills: 1 | Status: SHIPPED | OUTPATIENT
Start: 2019-05-07 | End: 2019-08-07 | Stop reason: SDUPTHER

## 2019-05-07 RX ORDER — RALOXIFENE HYDROCHLORIDE 60 MG/1
TABLET, FILM COATED ORAL
Qty: 90 TAB | Refills: 1 | Status: SHIPPED | OUTPATIENT
Start: 2019-05-07 | End: 2019-08-07 | Stop reason: SDUPTHER

## 2019-05-07 NOTE — TELEPHONE ENCOUNTER
Patient's daughter is calling to request medications. States she needs the Evista, Zetia, Vit  D3, and Flonase sent to [facility].

## 2019-08-07 ENCOUNTER — OFFICE VISIT (OUTPATIENT)
Dept: INTERNAL MEDICINE CLINIC | Age: 84
End: 2019-08-07

## 2019-08-07 VITALS
SYSTOLIC BLOOD PRESSURE: 148 MMHG | TEMPERATURE: 96.5 F | HEIGHT: 66 IN | HEART RATE: 72 BPM | OXYGEN SATURATION: 97 % | DIASTOLIC BLOOD PRESSURE: 78 MMHG | WEIGHT: 146 LBS | RESPIRATION RATE: 18 BRPM | BODY MASS INDEX: 23.46 KG/M2

## 2019-08-07 DIAGNOSIS — I10 ESSENTIAL HYPERTENSION: Primary | ICD-10-CM

## 2019-08-07 DIAGNOSIS — J30.89 NON-SEASONAL ALLERGIC RHINITIS, UNSPECIFIED TRIGGER: ICD-10-CM

## 2019-08-07 DIAGNOSIS — Z95.2 S/P TAVR (TRANSCATHETER AORTIC VALVE REPLACEMENT): ICD-10-CM

## 2019-08-07 DIAGNOSIS — F32.89 OTHER DEPRESSION: ICD-10-CM

## 2019-08-07 RX ORDER — MIRTAZAPINE 15 MG/1
15 TABLET, FILM COATED ORAL
Qty: 90 TAB | Refills: 3 | Status: SHIPPED | OUTPATIENT
Start: 2019-08-07

## 2019-08-07 RX ORDER — CARVEDILOL 3.12 MG/1
3.12 TABLET ORAL 2 TIMES DAILY WITH MEALS
Qty: 180 TAB | Refills: 3 | Status: SHIPPED | OUTPATIENT
Start: 2019-08-07

## 2019-08-07 RX ORDER — EZETIMIBE 10 MG/1
10 TABLET ORAL DAILY
Qty: 90 TAB | Refills: 3 | Status: SHIPPED | OUTPATIENT
Start: 2019-08-07

## 2019-08-07 RX ORDER — ERGOCALCIFEROL 1.25 MG/1
50000 CAPSULE ORAL
Qty: 12 CAP | Refills: 3 | Status: SHIPPED | OUTPATIENT
Start: 2019-08-07

## 2019-08-07 RX ORDER — RALOXIFENE HYDROCHLORIDE 60 MG/1
TABLET, FILM COATED ORAL
Qty: 90 TAB | Refills: 3 | Status: SHIPPED | OUTPATIENT
Start: 2019-08-07

## 2019-08-07 RX ORDER — LOSARTAN POTASSIUM 25 MG/1
25 TABLET ORAL DAILY
Qty: 90 TAB | Refills: 3 | Status: SHIPPED | OUTPATIENT
Start: 2019-08-07

## 2019-08-07 RX ORDER — FLUTICASONE PROPIONATE 50 MCG
2 SPRAY, SUSPENSION (ML) NASAL DAILY
Qty: 3 BOTTLE | Refills: 1 | Status: SHIPPED | OUTPATIENT
Start: 2019-08-07

## 2019-08-07 NOTE — PROGRESS NOTES
Rm: 12    Chief Complaint   Patient presents with    Hypertension     Depression Screening:  3 most recent PHQ Screens 2/7/2019   Little interest or pleasure in doing things Not at all   Feeling down, depressed, irritable, or hopeless Not at all   Total Score PHQ 2 0       Learning Assessment:  No flowsheet data found. Abuse Screening:  No flowsheet data found. Health Maintenance reviewed and discussed per provider: yes     Coordination of Care:    1. Have you been to the ER, urgent care clinic since your last visit? Hospitalized since your last visit? no    2. Have you seen or consulted any other health care providers outside of the [address] [place] [address] since your last visit? Include any pap smears or colon screening.  no

## 2019-08-07 NOTE — PROGRESS NOTES
HISTORY OF PRESENT ILLNESS  Aubrie Au is a 80 y.o. female. Here for f/u of HTN, depressive sx, allergic rhinitis. No complaints other than continued nasal congestion. Using flonase but reports she has not tolerated oral medication in the past and not interested in trying anything else. HTN: BP elevated today on arrival, improved on manual check. No CP, SOB. Has not seen cardiology recently for f/u of TAVR. Depressive sx improved some. Memory is stable. Has declined further w/u. Has paid caregiver and family support. Review of Systems   Constitutional: Negative for chills, fever and weight loss. HENT: Positive for congestion. Negative for ear pain. Eyes: Negative for blurred vision and pain. Respiratory: Negative for cough and shortness of breath. Cardiovascular: Negative for chest pain, palpitations and leg swelling. Gastrointestinal: Negative for nausea and vomiting. Genitourinary: Negative for frequency and urgency. Musculoskeletal: Negative for joint pain and myalgias. Skin: Negative for itching and rash. Neurological: Negative for dizziness, tingling and headaches. Psychiatric/Behavioral: Positive for depression. The patient is not nervous/anxious. Past Medical History:   Diagnosis Date    Dementia     Hypercholesteremia     Hypertension      Current Outpatient Medications on File Prior to Visit   Medication Sig Dispense Refill    raloxifene (EVISTA) 60 mg tablet take 1 tablet by mouth once daily 90 Tab 1    ezetimibe (ZETIA) 10 mg tablet Take 1 Tab by mouth daily. 90 Tab 1    fluticasone propionate (FLONASE) 50 mcg/actuation nasal spray 2 Sprays by Both Nostrils route daily. 3 Bottle 1    ergocalciferol (ERGOCALCIFEROL) 50,000 unit capsule Take 1 Cap by mouth every seven (7) days. 12 Cap 1    carvedilol (COREG) 3.125 mg tablet Take 1 Tab by mouth two (2) times daily (with meals).  180 Tab 3    acetaminophen (TYLENOL) 500 mg tablet Take 1 Tab by mouth two (2) times a day. Indications: Pain 100 Tab 3    losartan (COZAAR) 25 mg tablet Take 1 Tab by mouth daily. 90 Tab 3    mirtazapine (REMERON) 15 mg tablet Take 1 Tab by mouth nightly. 90 Tab 3    melatonin 5 mg cap capsule Take 5 mg by mouth nightly.  aspirin delayed-release 81 mg tablet Take  by mouth daily.  cyanocobalamin (VITAMIN B-12) 1,000 mcg tablet Take 1,000 mcg by mouth daily. No current facility-administered medications on file prior to visit. No Known Allergies  Physical Exam   Constitutional: She appears well-developed and well-nourished. No distress. /78 (BP 1 Location: Right arm, BP Patient Position: Sitting)   Pulse 72   Temp 96.5 °F (35.8 °C) (Oral)   Resp 18   Ht 5' 6\" (1.676 m)   Wt 146 lb (66.2 kg)   SpO2 97%   BMI 23.57 kg/m²      Eyes: EOM are normal. Right eye exhibits no discharge. Left eye exhibits no discharge. No scleral icterus. Neck: Neck supple. Cardiovascular: Normal rate, regular rhythm and normal heart sounds. Exam reveals no gallop and no friction rub. No murmur heard. Pulmonary/Chest: Effort normal and breath sounds normal. No respiratory distress. She has no wheezes. She has no rales. Musculoskeletal: She exhibits no edema or tenderness. Lymphadenopathy:     She has no cervical adenopathy. Neurological: She is alert. She exhibits normal muscle tone. Skin: Skin is warm and dry. Psychiatric: She has a normal mood and affect.      Lab Results   Component Value Date/Time    Sodium 139 02/26/2018 03:10 PM    Potassium 4.2 02/26/2018 03:10 PM    Chloride 105 02/26/2018 03:10 PM    CO2 28 02/26/2018 03:10 PM    Anion gap 6 02/26/2018 03:10 PM    Glucose 98 02/26/2018 03:10 PM    BUN 29 (H) 02/26/2018 03:10 PM    Creatinine 1.10 02/26/2018 03:10 PM    BUN/Creatinine ratio 26 (H) 02/26/2018 03:10 PM    GFR est AA 57 (L) 02/26/2018 03:10 PM    GFR est non-AA 47 (L) 02/26/2018 03:10 PM    Calcium 9.2 02/26/2018 03:10 PM    Bilirubin, total 0.2 02/26/2018 03:10 PM    AST (SGOT) 23 02/26/2018 03:10 PM    Alk. phosphatase 41 (L) 02/26/2018 03:10 PM    Protein, total 8.1 02/26/2018 03:10 PM    Albumin 4.0 02/26/2018 03:10 PM    Globulin 4.1 (H) 02/26/2018 03:10 PM    A-G Ratio 1.0 02/26/2018 03:10 PM    ALT (SGPT) 31 02/26/2018 03:10 PM     Lab Results   Component Value Date/Time    WBC 8.6 02/26/2018 03:10 PM    Hemoglobin, POC 12.9 11/12/2017 10:01 AM    HGB 12.1 02/26/2018 03:10 PM    Hematocrit, POC 38 11/12/2017 10:01 AM    HCT 36.3 02/26/2018 03:10 PM    PLATELET 748 07/39/4640 03:10 PM    MCV 87.7 02/26/2018 03:10 PM     ASSESSMENT and PLAN    ICD-10-CM ICD-9-CM    1. Essential hypertension I10 401.9    2. Other depression F32.89 311    3. S/P TAVR (transcatheter aortic valve replacement) Z95.2 V43.3    4. Non-seasonal allergic rhinitis, unspecified trigger J30.89 477.8      BP improved on manual check. She does not desire any further medication for allergy sx. Discussed using saline rinses prn  Mood stable. Has good social support.

## 2019-08-07 NOTE — PATIENT INSTRUCTIONS
Saline Nasal Washes: Care Instructions Your Care Instructions Saline nasal washes help keep the nasal passages open by washing out thick or dried mucus. This simple remedy can help relieve symptoms of allergies, sinusitis, and colds. It also can make the nose feel more comfortable by keeping the mucous membranes moist. You may notice a little burning sensation in your nose the first few times you use the solution, but this usually gets better in a few days. Follow-up care is a key part of your treatment and safety. Be sure to make and go to all appointments, and call your doctor if you are having problems. It's also a good idea to know your test results and keep a list of the medicines you take. How can you care for yourself at home? · You can buy premixed saline solution in a squeeze bottle or other sinus rinse products at a drugstore. Read and follow the instructions on the label. · You also can make your own saline solution by adding 1 teaspoon of salt and 1 teaspoon of baking soda to 2 cups of distilled water. · If you use a homemade solution, pour a small amount into a clean bowl. Using a rubber bulb syringe, squeeze the syringe and place the tip in the salt water. Pull a small amount of the salt water into the syringe by relaxing your hand. · Sit down with your head tilted slightly back. Do not lie down. Put the tip of the bulb syringe or the squeeze bottle a little way into one of your nostrils. Gently drip or squirt a few drops into the nostril. Repeat with the other nostril. Some sneezing and gagging are normal at first. 
· Gently blow your nose. · Wipe the syringe or bottle tip clean after each use. · Repeat this 2 or 3 times a day. · Use nasal washes gently if you have nosebleeds often. When should you call for help? Watch closely for changes in your health, and be sure to contact your doctor if: 
  · You often get nosebleeds.  
  · You have problems doing the nasal washes. Where can you learn more? Go to http://lauryn-marely.info/. Enter [id number] in the search box to learn more about \"Saline Nasal Washes: Care Instructions. \" Current as of: October 21, 2018 Content Version: 12.1 © 3542-5926 [employer]. Care instructions adapted under license by [employer] (which disclaims liability or warranty for this information). If you have questions about a medical condition or this instruction, always ask your healthcare professional. [address] any warranty or liability for your use of this information.